# Patient Record
Sex: FEMALE | Race: WHITE | NOT HISPANIC OR LATINO | Employment: OTHER | URBAN - METROPOLITAN AREA
[De-identification: names, ages, dates, MRNs, and addresses within clinical notes are randomized per-mention and may not be internally consistent; named-entity substitution may affect disease eponyms.]

---

## 2023-08-31 LAB
ALBUMIN (G/DL) IN SER/PLAS: 3.9 G/DL (ref 3.4–5)
ANION GAP IN SER/PLAS: 7 MMOL/L (ref 10–20)
CALCIUM (MG/DL) IN SER/PLAS: 9.3 MG/DL (ref 8.6–10.3)
CARBON DIOXIDE, TOTAL (MMOL/L) IN SER/PLAS: 32 MMOL/L (ref 21–32)
CHLORIDE (MMOL/L) IN SER/PLAS: 101 MMOL/L (ref 98–107)
CREATININE (MG/DL) IN SER/PLAS: 0.53 MG/DL (ref 0.5–1.05)
GFR FEMALE: >90 ML/MIN/1.73M2
GLUCOSE (MG/DL) IN SER/PLAS: 80 MG/DL (ref 74–99)
PHOSPHATE (MG/DL) IN SER/PLAS: 3.1 MG/DL (ref 2.5–4.9)
POTASSIUM (MMOL/L) IN SER/PLAS: 3.6 MMOL/L (ref 3.5–5.3)
SODIUM (MMOL/L) IN SER/PLAS: 136 MMOL/L (ref 136–145)
UREA NITROGEN (MG/DL) IN SER/PLAS: 14 MG/DL (ref 6–23)

## 2023-10-11 ENCOUNTER — TREATMENT (OUTPATIENT)
Dept: SPEECH THERAPY | Facility: CLINIC | Age: 83
End: 2023-10-11
Payer: MEDICARE

## 2023-10-11 DIAGNOSIS — R49.0 HOARSENESS OF VOICE: ICD-10-CM

## 2023-10-11 DIAGNOSIS — J38.01 PARALYSIS OF RIGHT VOCAL CORD: Primary | ICD-10-CM

## 2023-10-11 DIAGNOSIS — J38.3 GLOTTIC INSUFFICIENCY: ICD-10-CM

## 2023-10-11 PROCEDURE — 92507 TX SP LANG VOICE COMM INDIV: CPT | Mod: GN

## 2023-10-11 PROCEDURE — 2700000051 HC SLP MISCELLANEOUS SUPPLY

## 2023-10-11 RX ORDER — BISACODYL 5 MG
5 TABLET, DELAYED RELEASE (ENTERIC COATED) ORAL DAILY PRN
COMMUNITY

## 2023-10-11 RX ORDER — IBUPROFEN 200 MG
TABLET ORAL
COMMUNITY

## 2023-10-11 RX ORDER — LUTEIN 6 MG
TABLET ORAL
COMMUNITY

## 2023-10-11 RX ORDER — IBANDRONATE SODIUM 150 MG/1
1 TABLET, FILM COATED ORAL
COMMUNITY
Start: 2023-07-12

## 2023-10-11 RX ORDER — HYDROCHLOROTHIAZIDE 25 MG/1
1 TABLET ORAL DAILY
COMMUNITY
Start: 2023-07-12

## 2023-10-11 RX ORDER — CYCLOSPORINE 0.5 MG/ML
1 EMULSION OPHTHALMIC EVERY 12 HOURS
COMMUNITY
Start: 2023-08-23

## 2023-10-11 RX ORDER — FLUTICASONE PROPIONATE 50 MCG
1 SPRAY, SUSPENSION (ML) NASAL DAILY
COMMUNITY

## 2023-10-11 ASSESSMENT — ENCOUNTER SYMPTOMS
OCCASIONAL FEELINGS OF UNSTEADINESS: 0
LOSS OF SENSATION IN FEET: 0
DEPRESSION: 0

## 2023-10-11 ASSESSMENT — PAIN - FUNCTIONAL ASSESSMENT: PAIN_FUNCTIONAL_ASSESSMENT: 0-10

## 2023-10-11 ASSESSMENT — PAIN SCALES - GENERAL: PAINLEVEL_OUTOF10: 0 - NO PAIN

## 2023-10-11 NOTE — PROGRESS NOTES
Speech-Language Pathology    Voice Treatment    Patient Name: Kathy Mckinney  MRN: [id number]  Today's Date: 10/11/2023     Time Calculation  Start Time: 1115  Stop Time: 1200  Time Calculation (min): 45 min      Current Problem:  Patient Active Problem List   Diagnosis    Dysphonia    Glottic insufficiency    Hoarseness of voice    Paralysis of right vocal cord   SUBJECTIVE  Patient alert and oriented and ready to participate in office visit this date. Patient is being seen for their first follow-up visit in [facility] this date. For full history, evaluation, and other details from previous care to-date, please refer to past medical records in Ambulatory Electronic Medical Records. Most recent eval/re-eval was completed on 9/26/2023.    OBJECTIVE  LONG TERM GOAL  Improve overall vocal/respiratory health to foster increased participation levels at home, work and in the community environment.    SHORT TERM GOALS  Patient will increase vocal wellness and decrease phono trauma in adherence with clinician prescribed vocal hygiene and wellness program per patient report.  Patient will demonstrate independent use of breathing techniques x 80% accuracy.  Patient will participate in EMST/The Breather RMT to improve respiratory strength.  Patient will increase the balance/strength of the respiratory/laryngeal musculature x 80% accuracy. PRN    ASSESSMENT  Instructed patient this date in:  cough/throat clear reduction techniques (effortful swallow, RTB, Marquita, silent cough/clear)  seltzer or warm water gargle technique to safely remove mucus  vocal wellness strategies to reduce cough/throat clear triggers and phono trauma  reflux lifestyle modifications    Introduced The Breather RMT to increase respiratory strength. All parts were thoroughly explained and clinician demonstrated how to determine starting point and weekly exercise program. Calibrated device with the patient and determine an appropriate starting point is  resistance of 2 inhale:1 exhale. Patient instructed to practice 2 sets of 10 reps, twice a day. Completed this date with diaphragmatic breath support. Discussed importance of pausing in between each to allow the muscles to reset for optimal benefit.    Clinician modeled all techniques and accurate patient follow through confirmed.  Handouts provided to facilitate optimal home carryover.    PLAN  Continue current plan of care    Progress with POC, as tolerated  Discussed POC with patient  Patient/caregiver agreeable with POC

## 2023-10-26 ENCOUNTER — TREATMENT (OUTPATIENT)
Dept: SPEECH THERAPY | Facility: CLINIC | Age: 83
End: 2023-10-26
Payer: MEDICARE

## 2023-10-26 DIAGNOSIS — J38.3 GLOTTIC INSUFFICIENCY: ICD-10-CM

## 2023-10-26 DIAGNOSIS — R49.0 HOARSENESS OF VOICE: ICD-10-CM

## 2023-10-26 DIAGNOSIS — J38.01 PARALYSIS OF RIGHT VOCAL CORD: Primary | ICD-10-CM

## 2023-10-26 PROCEDURE — 92507 TX SP LANG VOICE COMM INDIV: CPT | Mod: GN

## 2023-10-26 ASSESSMENT — PAIN SCALES - GENERAL: PAINLEVEL_OUTOF10: 0 - NO PAIN

## 2023-10-26 ASSESSMENT — PAIN - FUNCTIONAL ASSESSMENT: PAIN_FUNCTIONAL_ASSESSMENT: 0-10

## 2023-10-26 NOTE — PROGRESS NOTES
Speech-Language Pathology    Voice Treatment    Patient Name: Kathy Mckinney  MRN: [id number]  Today's Date: 10/26/2023     Time Calculation  Start Time: 1115  Stop Time: 1200  Time Calculation (min): 45 min      Current Problem:  Patient Active Problem List   Diagnosis    Dysphonia    Glottic insufficiency    Hoarseness of voice    Paralysis of right vocal cord     General Visit Information:  Patient Class: Outpatient  Living Environment: Home  Arrival: Independent; Family/caregiver present  Ordering Physician: Dr. Sim  Certification Period Start Date:   Certification Period End Date:   Visit Number:    Pain Assessment:  Pain Assessment: 0-10  Pain Score: 0 - No pain    SUBJECTIVE  Patient alert and oriented and ready to participate in office visit this date.      OBJECTIVE  LONG TERM GOAL  Improve overall vocal/respiratory health to foster increased participation levels at home, work and in the community environment.     SHORT TERM GOALS  Patient will increase vocal wellness and decrease phono trauma in adherence with clinician prescribed vocal hygiene and wellness program per patient report. MET  Patient will demonstrate independent use of breathing techniques x 80% accuracy. MET  Patient will participate in EMST/The Breather RMT to improve respiratory strength. MET  Patient will increase the balance/strength of the respiratory/laryngeal musculature x 80% accuracy. DC     ASSESSMENT  Patient reports good follow through of home program including working on throat clear reduction and building up her respiratory drive via The Breather RMT. As a result she reports reducing her throat clearing by > 80% via learned techniques. She had some questions regarding the seltzer gargle technique and sinus irrigation. All questions answered to patients satisfaction. Sample CamiloROLI sinus rinse kit provided along with written instructions.     Monitored performance with The Breather RMT introduced at last visit to  increase respiratory strength. Patient had questions regarding how and when to re-calibrated the device. Device was re-calibrated together with the target of working at an effort level of 5-7/10. As a result resistance was able to be increased to 4 inhale and 3 exhale. Patient instructed to continue to practice 2 sets of 10 reps, twice a day, 6 days a week. After a one day rest patient to re-calibrate thew device for the next few weeks until she is on the maintenance program.        PLAN  Discharge patient 2/2 goals met  Monitor home program  Patient instructed to call if there are problems  Patient to follow up with physician  Discussed POC with patient  Patient/caregiver agreeable with POC

## 2024-03-26 ENCOUNTER — OFFICE VISIT (OUTPATIENT)
Dept: OTOLARYNGOLOGY | Facility: CLINIC | Age: 84
End: 2024-03-26
Payer: MEDICARE

## 2024-03-26 VITALS — HEIGHT: 59 IN | WEIGHT: 110 LBS | BODY MASS INDEX: 22.18 KG/M2

## 2024-03-26 DIAGNOSIS — J38.3 GLOTTIC INSUFFICIENCY: Primary | ICD-10-CM

## 2024-03-26 DIAGNOSIS — J38.01 PARALYSIS OF RIGHT VOCAL CORD: ICD-10-CM

## 2024-03-26 DIAGNOSIS — R49.0 HOARSENESS OF VOICE: ICD-10-CM

## 2024-03-26 PROCEDURE — 1160F RVW MEDS BY RX/DR IN RCRD: CPT | Performed by: OTOLARYNGOLOGY

## 2024-03-26 PROCEDURE — 1036F TOBACCO NON-USER: CPT | Performed by: OTOLARYNGOLOGY

## 2024-03-26 PROCEDURE — 1159F MED LIST DOCD IN RCRD: CPT | Performed by: OTOLARYNGOLOGY

## 2024-03-26 PROCEDURE — 31579 LARYNGOSCOPY TELESCOPIC: CPT | Performed by: OTOLARYNGOLOGY

## 2024-03-26 ASSESSMENT — PATIENT HEALTH QUESTIONNAIRE - PHQ9
2. FEELING DOWN, DEPRESSED OR HOPELESS: NOT AT ALL
SUM OF ALL RESPONSES TO PHQ9 QUESTIONS 1 AND 2: 0
1. LITTLE INTEREST OR PLEASURE IN DOING THINGS: NOT AT ALL

## 2024-03-26 NOTE — LETTER
March 26, 2024     Keily Ward MD  [address] [address]  [address]  [facility] [id number]    Patient: Kathy Mckinney   YOB: 1940   Date of Visit: 3/26/2024       Dear Dr. Keily Ward MD:    Thank you for referring Kathy Mckinney to me for evaluation. Below are my notes for this consultation.  If you have questions, please do not hesitate to call me. I look forward to following your patient along with you.       Sincerely,     Azra Sim MD      CC: No Recipients  ______________________________________________________________________________________    Chief Complaint  Chief Complaint   Patient presents with   • Hoarseness     6 month check up         Pertinent History:  Follow up for a vocal cord paralysis of the right s/p vocal cord injection with Restylane. She has a history of speech therapy x7 sessions on respiratory retraining     Interval History (09/2023):   She had improvement in her voice after the vocal cord injection and speech therapy. She is using her breather and generalizing her speech techniques. She is singing on Sunday's and has noticed fatigue in her voice. Her voice is worse in the morning and improves as the day goes on. She still has good effect from the injection.     Exam:  VOICE: Variable hoarseness that improved with good breath support.    RESPIRATION: Breathing comfortably, no stridor.    ORAL CAVITY/OROPHARYNX/LIPS: Normal mucous membranes, normal floor of mouth/tongue/OP, no masses or lesions are noted.    SKIN: Neck skin is without scar or injury.    PSYCH: Alert and oriented with appropriate mood and affect.       PROCEDURE NOTE:  Recommended stroboscopy.  Risks, benefits,  and alternatives were explained. They wished to proceed and provides verbal consent.     PROCEDURE: Flexible laryngoscopy with stroboscopy, CPT 64077     POSTPROCEDURE DIAGNOSIS: hoarseness     INDICATIONS: Inability to tolerate mirror exam or abnormal findings on mirror,  Stroboscopy performed to assess one of the followin. Diagnosis of symptomatic disorder involving the voice, swallow, upper aerodigestive tract, including ARIS disorders, or  2. Preoperative evaluation of vocal cord function for individuals undergoing surgery where the RLN or vagus nerves are at risk of injury, or  3. Further evaluation of abnormalities of the upper aerodigestive tract discovered by another modality, such as CT, MRI, bronchoscopy or EGD    Description of Procedure:    After adequate afrin and lidocaine spray, I advanced the endoscope.  Visualization of the nasopharynx, vallecula, posterior pharyngeal walls, pyriform, epiglottis and post cricoid areas was unremarkable.  The following laryngeal findings were noted:    Vocal cord movement is right immobile with right near midline and left normal  Closure is incomplete with a small mid glottal gap   Mucosal wave amplitude is slightly increased on the right > left  Periodic/symmetric mucosal wave   Increased compression symmetric and markedly improved   Pharyngeal wall contraction is normal   No pooling of secretions     Procedure well tolerated.    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  At this time she is happy and feels she is doing well.  She does not feel she needs an injection now.  We did discuss options of repeat restylane when she is ready vs fat.  Otherwise vocal hygiene and technique was discussed.     We discussed:  Consideration of a fat injection into the right > left vocal cord.  The risks, indications, and complications were discussed with the patient. She elected to undergo this later in year as she has other medical procedures scheduled. She will call the office to schedule this.     The patient's questions were answered.    Serjioibe  Attestation  By signing my name below, I, Renetta Neal attest that this documentation has been prepared under the direction and in the presence of Azra Sim MD.

## 2024-03-26 NOTE — PROGRESS NOTES
Chief Complaint  Chief Complaint   Patient presents with    Hoarseness     6 month check up         Pertinent History:  Follow up for a vocal cord paralysis of the right s/p vocal cord injection with Restylane. She has a history of speech therapy x7 sessions on respiratory retraining     Interval History (2023):   She had improvement in her voice after the vocal cord injection and speech therapy. She is using her breather and generalizing her speech techniques. She is singing on 's and has noticed fatigue in her voice. Her voice is worse in the morning and improves as the day goes on. She still has good effect from the injection.     Exam:  VOICE: Variable hoarseness that improved with good breath support.    RESPIRATION: Breathing comfortably, no stridor.    ORAL CAVITY/OROPHARYNX/LIPS: Normal mucous membranes, normal floor of mouth/tongue/OP, no masses or lesions are noted.    SKIN: Neck skin is without scar or injury.    PSYCH: Alert and oriented with appropriate mood and affect.       PROCEDURE NOTE:  Recommended stroboscopy.  Risks, benefits,  and alternatives were explained. They wished to proceed and provides verbal consent.     PROCEDURE: Flexible laryngoscopy with stroboscopy, CPT 68758     POSTPROCEDURE DIAGNOSIS: hoarseness     INDICATIONS: Inability to tolerate mirror exam or abnormal findings on mirror, Stroboscopy performed to assess one of the followin. Diagnosis of symptomatic disorder involving the voice, swallow, upper aerodigestive tract, including ARIS disorders, or  2. Preoperative evaluation of vocal cord function for individuals undergoing surgery where the RLN or vagus nerves are at risk of injury, or  3. Further evaluation of abnormalities of the upper aerodigestive tract discovered by another modality, such as CT, MRI, bronchoscopy or EGD    Description of Procedure:    After adequate afrin and lidocaine spray, I advanced the endoscope.  Visualization of the nasopharynx,  vallecula, posterior pharyngeal walls, pyriform, epiglottis and post cricoid areas was unremarkable.  The following laryngeal findings were noted:    Vocal cord movement is right immobile with right near midline and left normal  Closure is incomplete with a small mid glottal gap   Mucosal wave amplitude is slightly increased on the right > left  Periodic/symmetric mucosal wave   Increased compression symmetric and markedly improved   Pharyngeal wall contraction is normal   No pooling of secretions     Procedure well tolerated.    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  At this time she is happy and feels she is doing well.  She does not feel she needs an injection now.  We did discuss options of repeat restylane when she is ready vs fat.  Otherwise vocal hygiene and technique was discussed.     We discussed:  Consideration of a fat injection into the right > left vocal cord.  The risks, indications, and complications were discussed with the patient. She elected to undergo this later in year as she has other medical procedures scheduled. She will call the office to schedule this.     The patient's questions were answered.    Scribe Attestation  By signing my name below, IJeanine , Renetta attest that this documentation has been prepared under the direction and in the presence of Azra Sim MD.